# Patient Record
Sex: FEMALE | Race: WHITE | Employment: OTHER | URBAN - NONMETROPOLITAN AREA
[De-identification: names, ages, dates, MRNs, and addresses within clinical notes are randomized per-mention and may not be internally consistent; named-entity substitution may affect disease eponyms.]

---

## 2019-12-11 ENCOUNTER — TELEPHONE (OUTPATIENT)
Dept: NEUROLOGY | Age: 69
End: 2019-12-11

## 2020-02-12 ENCOUNTER — OFFICE VISIT (OUTPATIENT)
Dept: NEUROLOGY | Age: 70
End: 2020-02-12
Payer: MEDICARE

## 2020-02-12 VITALS — DIASTOLIC BLOOD PRESSURE: 61 MMHG | SYSTOLIC BLOOD PRESSURE: 121 MMHG | HEART RATE: 88 BPM | WEIGHT: 139.38 LBS

## 2020-02-12 DIAGNOSIS — R41.3 MEMORY LOSS: ICD-10-CM

## 2020-02-12 PROBLEM — F41.9 ANXIETY AND DEPRESSION: Status: ACTIVE | Noted: 2020-02-12

## 2020-02-12 PROBLEM — F43.21 GRIEF: Status: ACTIVE | Noted: 2020-02-12

## 2020-02-12 PROBLEM — F32.A ANXIETY AND DEPRESSION: Status: ACTIVE | Noted: 2020-02-12

## 2020-02-12 LAB
AMMONIA: 29 UMOL/L (ref 11–51)
HIV-1 P24 AG: NORMAL
RAPID HIV 1&2: NORMAL
RHEUMATOID FACTOR: <10 IU/ML
SEDIMENTATION RATE, ERYTHROCYTE: 2 MM/HR (ref 0–25)
T4 FREE: 1.18 NG/DL (ref 0.93–1.7)
TSH SERPL DL<=0.05 MIU/L-ACNC: 4.29 UIU/ML (ref 0.27–4.2)
VITAMIN B-12: 646 PG/ML (ref 211–946)

## 2020-02-12 PROCEDURE — 1090F PRES/ABSN URINE INCON ASSESS: CPT | Performed by: PSYCHIATRY & NEUROLOGY

## 2020-02-12 PROCEDURE — G8421 BMI NOT CALCULATED: HCPCS | Performed by: PSYCHIATRY & NEUROLOGY

## 2020-02-12 PROCEDURE — 1036F TOBACCO NON-USER: CPT | Performed by: PSYCHIATRY & NEUROLOGY

## 2020-02-12 PROCEDURE — 99204 OFFICE O/P NEW MOD 45 MIN: CPT | Performed by: PSYCHIATRY & NEUROLOGY

## 2020-02-12 PROCEDURE — G8484 FLU IMMUNIZE NO ADMIN: HCPCS | Performed by: PSYCHIATRY & NEUROLOGY

## 2020-02-12 PROCEDURE — G8400 PT W/DXA NO RESULTS DOC: HCPCS | Performed by: PSYCHIATRY & NEUROLOGY

## 2020-02-12 PROCEDURE — G8427 DOCREV CUR MEDS BY ELIG CLIN: HCPCS | Performed by: PSYCHIATRY & NEUROLOGY

## 2020-02-12 PROCEDURE — 3017F COLORECTAL CA SCREEN DOC REV: CPT | Performed by: PSYCHIATRY & NEUROLOGY

## 2020-02-12 PROCEDURE — 4040F PNEUMOC VAC/ADMIN/RCVD: CPT | Performed by: PSYCHIATRY & NEUROLOGY

## 2020-02-12 PROCEDURE — 1123F ACP DISCUSS/DSCN MKR DOCD: CPT | Performed by: PSYCHIATRY & NEUROLOGY

## 2020-02-12 RX ORDER — LORAZEPAM 0.5 MG/1
TABLET ORAL
COMMUNITY
Start: 2020-01-20

## 2020-02-12 RX ORDER — DIVALPROEX SODIUM 125 MG/1
TABLET, DELAYED RELEASE ORAL
COMMUNITY
Start: 2020-01-24

## 2020-02-12 RX ORDER — ESCITALOPRAM OXALATE 10 MG/1
TABLET ORAL
COMMUNITY
Start: 2020-01-24

## 2020-02-12 RX ORDER — LANOLIN ALCOHOL/MO/W.PET/CERES
3 CREAM (GRAM) TOPICAL DAILY
COMMUNITY

## 2020-02-12 RX ORDER — MEMANTINE HYDROCHLORIDE 5 MG/1
TABLET ORAL
COMMUNITY
Start: 2020-01-24

## 2020-02-12 SDOH — HEALTH STABILITY: MENTAL HEALTH: HOW OFTEN DO YOU HAVE A DRINK CONTAINING ALCOHOL?: NEVER

## 2020-02-12 NOTE — PROGRESS NOTES
insecurity:     Worry: Not on file     Inability: Not on file    Transportation needs:     Medical: Not on file     Non-medical: Not on file   Tobacco Use    Smoking status: Never Smoker    Smokeless tobacco: Never Used   Substance and Sexual Activity    Alcohol use: Never     Frequency: Never    Drug use: Not on file    Sexual activity: Not on file   Lifestyle    Physical activity:     Days per week: Not on file     Minutes per session: Not on file    Stress: Not on file   Relationships    Social connections:     Talks on phone: Not on file     Gets together: Not on file     Attends [employer] service: Not on file     Active member of club or organization: Not on file     Attends meetings of clubs or organizations: Not on file     Relationship status: Not on file    Intimate partner violence:     Fear of current or ex partner: Not on file     Emotionally abused: Not on file     Physically abused: Not on file     Forced sexual activity: Not on file   Other Topics Concern    Not on file   Social History Narrative    Not on file       Review of Systems     Constitutional - No fever or chills. No diaphoresis or significant fatigue. HENT -  No tinnitus or significant hearing loss. Eyes - no sudden vision change or eye pain  Respiratory - no significant shortness of breath or cough  Cardiovascular - no chest pain No palpitations or significant leg swelling  Gastrointestinal - no abdominal swelling or pain. Genitourinary - yes difficulty urinating, dysuria  Musculoskeletal - no back pain or myalgia. Skin - no color change or rash  Neurologic - No seizures. No lateralizing weakness. Hematologic - no easy bruising or excessive bleeding. Psychiatric - yes severe anxiety or nervousness. All other review of systems are negative.       Current Outpatient Medications   Medication Sig Dispense Refill    LORazepam (ATIVAN) 0.5 MG tablet TAKE 1 TABLET BY MOUTH TWICE DAILY AS NEEDED      escitalopram (LEXAPRO) 10 MG tablet TAKE 1 TABLET BY MOUTH ONCE DAILY FOR MOOD      divalproex (DEPAKOTE) 125 MG DR tablet TAKE 1 TABLET BY MOUTH EVERY 8 HOURS FOR BEHAVIOR      memantine (NAMENDA) 5 MG tablet TAKE 1 TABLET BY MOUTH ONCE DAILY FOR MEMORY      melatonin 3 MG TABS tablet Take 3 mg by mouth daily       No current facility-administered medications for this visit. /61   Pulse 88   Wt 139 lb 6 oz (63.2 kg)     Constitutional - well developed, well nourished. Eyes - conjunctiva normal.    Ear, nose, throat -hearing intact to finger rub No scars, masses, or lesions over external nose or ears, no atrophy of tongue  Neck-symmetric, no masses noted, no jugular vein distension  Respiration- chest wall appears symmetric, good expansion,   normal effort without use of accessory muscles  Musculoskeletal - no significant wasting of muscles noted, no bony deformities  Extremities-no clubbing, cyanosis or edema  Skin - warm, dry, and intact. No rash, erythema, or pallor.   Psychiatric - mood, affect, and behavior appear tearful      Neurological exam  Awake, alert, tearful and not speaking much not making much eye contact  Attention and concentration appear impaired   Recent and remote memory unable to test  Speech normal without dysarthria  No clear issues with language of fund of knowledge    Cranial Nerve Exam   CN II- Visual fields grossly says I am holding up 5 and 6 fingers on either hand when holding up 1 or 2 finger  CN III, IV,VI-EOMI, No nystagmus, conjugate eye movements, no ptosis  CN V-sensation unreliable  CN VII-no facial assymetry  CN VIII-Hearing intact to finger rub  CN IX and X- Palate not tested   CN XI-not test  shoulder shrug  CN XII-Tongue not cooperate    Motor Exam  antigravity throughout upper and lower extremities bilaterally, no cogwheeling, normal tone    Sensory Exam  Sensation unreliable    Reflexes   2+ biceps bilaterally  2+ brachioradialis  2+patella  2+ ankle jerks  No clonus ankles  No Samuel's sign bilateral hands    Tremors- no tremors in hands or head noted    Gait  Normal base and speed  No ataxia    Coordination  Finger to nose-not cooperate    No results found for: [id number]  No results found for: WBC, HGB, HCT, MCV, PLT  No results found for: NA, K, CL, CO2, BUN, CREATININE, GLUCOSE, CALCIUM, PROT, LABALBU, BILITOT, ALKPHOS, AST, ALT, LABGLOM, GFRAA, AGRATIO, GLOB        Assessment    ICD-10-CM    1. Memory loss R41. 3 Ammonia     T4, Free     TSH without Reflex     Vitamin B12     Anti-Neutrophilic Cytoplasmic Antibody     B. Burgdorferi Antibodies by WB     Celiac AG IGA/IGG Screen w Reflex     Electrophoresis Protein, Serum with Reflex to Immunofixation     Gliadin Antibody, IgA     Heavy Metal Blood Panel     HIV 1 Antibody     [place]/Lambda Free Lt Chains, Serum Quant     Lupus (LE) panel w/ reflex     LYME (B.BURGDORFERI) PCR     Methylmalonic Acid, Serum     Rheumatoid Factor     Sedimentation Rate     Zinc     RPR Reflex to Titer and TPPA     West Nile Antibodies, IgG and IgM     CT Head WO Contrast   2. Anxiety and depression F41.9 [id number] [facility]    F32.9    3. Grief F43.21        Her neurological examination today was limited by her cooperation. She was tearful through the visit and did not speak much. She said I was holding up 5 or 6 fingers when I was holding up 1 or 2 fingers. 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 Certainly dementia is in the differential. At this time she will be scheduled for blood work as noted. She will have a CT of the head for completeness and be referred to psychiatry. She will not be initiated on any new medicines for her cognitive issues till patient is treated for her severe depression. She is already on a low dose of Namenda. The patient's son indicated understanding of the management plan.  She is to follow up with me in 6 months and call with any issues. Plan    Orders Placed This Encounter   Procedures    CT Head WO Contrast    Ammonia    T4, Free    TSH without Reflex    Vitamin B12    Anti-Neutrophilic Cytoplasmic Antibody    B. Burgdorferi Antibodies by WB    Celiac AG IGA/IGG Screen w Reflex    Electrophoresis Protein, Serum with Reflex to Immunofixation    Gliadin Antibody, IgA    Heavy Metal Blood Panel    HIV 1 Antibody    [place]/Lambda Free Lt Chains, Serum Quant    Lupus (LE) panel w/ reflex    LYME (B.BURGDORFERI) PCR    Methylmalonic Acid, Serum    Rheumatoid Factor    Sedimentation Rate    Zinc    RPR Reflex to Titer and TPPA    West Nile Antibodies, IgG and IgM   [facility]       No orders of the defined types were placed in this encounter. Return in about 6 months (around 8/12/2020). EMR Dragon/transcription disclaimer:Significant part of this  encounter note is electronic transcription/translation of spoken language to printed text. The electronic translation of spoken language may be erroneous, or at times, nonsensical words or phrases may be inadvertently transcribed.  Although I have reviewed the note for such errors, some may still exist.

## 2020-02-12 NOTE — PROGRESS NOTES
Review of Systems    Constitutional - No fever or chills. No diaphoresis or significant fatigue. HENT -  No tinnitus or significant hearing loss. Eyes - no sudden vision change or eye pain  Respiratory - no significant shortness of breath or cough  Cardiovascular - no chest pain No palpitations or significant leg swelling  Gastrointestinal - no abdominal swelling or pain. Genitourinary - yes difficulty urinating, dysuria  Musculoskeletal - no back pain or myalgia. Skin - no color change or rash  Neurologic - No seizures. No lateralizing weakness. Hematologic - no easy bruising or excessive bleeding. Psychiatric - yes severe anxiety or nervousness. All other review of systems are negative.

## 2020-02-13 LAB — RPR: NORMAL

## 2020-02-14 LAB
FREE KAPPA LIGHT CHAINS: 1.02 MG/DL (ref 0.37–1.94)
FREE KAPPA/LAMBDA RATIO: 0.98 (ref 0.26–1.65)
FREE LAMBDA LIGHT CHAINS: 1.04 MG/DL (ref 0.57–2.63)
WEST NILE VIRUS, IGG: 0.02 IV
WEST NILE VIRUS, IGM: 0 IV

## 2020-02-15 LAB
ANA IGG, ELISA: NORMAL
ANCA IFA: NORMAL
C3 COMPLEMENT: 113 MG/DL (ref 88–201)
C4 COMPLEMENT: 18 MG/DL (ref 10–40)
GLIADIN ANTIBODIES IGA: 3 UNITS (ref 0–19)
LYME (B. BURGDORFERI) AB IGG WB: NEGATIVE
LYME AB IGM BY WB:: POSITIVE
METHYLMALONIC ACID: 0.16 UMOL/L (ref 0–0.4)
RHEUMATOID FACTOR: <10 IU/ML (ref 0–14)
ZINC: 70.5 UG/DL (ref 60–120)

## 2020-02-17 ENCOUNTER — HOSPITAL ENCOUNTER (OUTPATIENT)
Dept: CT IMAGING | Age: 70
Discharge: HOME | End: 2020-02-17
Payer: MEDICARE

## 2020-02-17 LAB
ALBUMIN SERPL-MCNC: 4.27 G/DL (ref 3.75–5.01)
ALPHA-1-GLOBULIN: 0.27 G/DL (ref 0.19–0.46)
ALPHA-2-GLOBULIN: 0.7 G/DL (ref 0.48–1.05)
BETA GLOBULIN: 0.85 G/DL (ref 0.48–1.1)
CELIAC PANEL: 4 UNITS (ref 0–19)
GAMMA GLOBULIN: 0.81 G/DL (ref 0.62–1.51)
IMMUNOFIXATION REFLEX: NORMAL
SPE/IFE INTERPRETATION: NORMAL
TOTAL PROTEIN: 6.9 G/DL (ref 6.3–8.2)

## 2020-02-17 PROCEDURE — 70450 CT HEAD/BRAIN W/O DYE: CPT

## 2020-02-17 RX ORDER — DOXYCYCLINE HYCLATE 100 MG
100 TABLET ORAL 2 TIMES DAILY
Qty: 20 TABLET | Refills: 0 | Status: SHIPPED | OUTPATIENT
Start: 2020-02-17 | End: 2020-02-27

## 2020-02-18 ENCOUNTER — TELEPHONE (OUTPATIENT)
Dept: PSYCHIATRY | Age: 70
End: 2020-02-18

## 2020-02-18 ENCOUNTER — TELEPHONE (OUTPATIENT)
Dept: NEUROLOGY | Age: 70
End: 2020-02-18

## 2020-02-18 NOTE — TELEPHONE ENCOUNTER
I called the patient and spoke with her son and gave him the results. He gave a verbal that he would get her medication.

## 2020-02-18 NOTE — TELEPHONE ENCOUNTER
Tried to call pt's son to let them know of the appointment with Haley Badillo and there was no answer.

## 2020-02-18 NOTE — TELEPHONE ENCOUNTER
----- Message from Briana Lara MD sent at 2/17/2020 12:50 PM CST -----  Her lyme titer was positive which means she was recently exposed to lyme. I sent in a 10 days supply of doxycycline to treat.  She should follow up with her PCP after that